# Patient Record
Sex: FEMALE | Race: WHITE | NOT HISPANIC OR LATINO | URBAN - METROPOLITAN AREA
[De-identification: names, ages, dates, MRNs, and addresses within clinical notes are randomized per-mention and may not be internally consistent; named-entity substitution may affect disease eponyms.]

---

## 2019-01-06 ENCOUNTER — OFFICE VISIT - RIVER FALLS (OUTPATIENT)
Dept: FAMILY MEDICINE | Facility: CLINIC | Age: 64
End: 2019-01-06

## 2019-01-06 LAB
ALBUMIN UR-MCNC: NEGATIVE G/DL
BILIRUB UR QL STRIP: NEGATIVE
GLUCOSE UR STRIP-MCNC: NEGATIVE MG/DL
HGB UR QL STRIP: ABNORMAL
KETONES UR STRIP-MCNC: NEGATIVE MG/DL
LEUKOCYTE ESTERASE UR QL STRIP: ABNORMAL
NITRATE UR QL: NEGATIVE
PH UR STRIP: 7 [PH] (ref 5–8)
SP GR UR STRIP: 1.01 (ref 1–1.03)

## 2019-01-06 ASSESSMENT — MIFFLIN-ST. JEOR: SCORE: 1168.45

## 2019-01-08 LAB — BACTERIA SPEC CULT: NORMAL

## 2019-01-10 ENCOUNTER — COMMUNICATION - RIVER FALLS (OUTPATIENT)
Dept: FAMILY MEDICINE | Facility: CLINIC | Age: 64
End: 2019-01-10

## 2019-01-10 ENCOUNTER — OFFICE VISIT - RIVER FALLS (OUTPATIENT)
Dept: FAMILY MEDICINE | Facility: CLINIC | Age: 64
End: 2019-01-10

## 2019-01-10 LAB
ALBUMIN UR-MCNC: NEGATIVE G/DL
BILIRUB UR QL STRIP: NEGATIVE
GLUCOSE UR STRIP-MCNC: NEGATIVE MG/DL
HGB UR QL STRIP: NEGATIVE
KETONES UR STRIP-MCNC: NEGATIVE MG/DL
LEUKOCYTE ESTERASE UR QL STRIP: ABNORMAL
NITRATE UR QL: NEGATIVE
PH UR STRIP: 5.5 [PH] (ref 5–8)
SP GR UR STRIP: 1.01 (ref 1–1.03)

## 2019-01-10 ASSESSMENT — MIFFLIN-ST. JEOR: SCORE: 1168.45

## 2019-01-12 LAB — BACTERIA SPEC CULT: NORMAL

## 2019-01-24 ENCOUNTER — OFFICE VISIT - RIVER FALLS (OUTPATIENT)
Dept: FAMILY MEDICINE | Facility: CLINIC | Age: 64
End: 2019-01-24

## 2019-01-24 ASSESSMENT — MIFFLIN-ST. JEOR: SCORE: 1170.26

## 2019-01-25 LAB
CHOLEST SERPL-MCNC: 218 MG/DL
CHOLEST/HDLC SERPL: 2.8 {RATIO}
HBA1C MFR BLD: 5.4 %
HDLC SERPL-MCNC: 78 MG/DL
LDLC SERPL CALC-MCNC: 123 MG/DL
NONHDLC SERPL-MCNC: 140 MG/DL
TRIGL SERPL-MCNC: 76 MG/DL

## 2019-12-23 ENCOUNTER — OFFICE VISIT - RIVER FALLS (OUTPATIENT)
Dept: FAMILY MEDICINE | Facility: CLINIC | Age: 64
End: 2019-12-23

## 2019-12-23 ASSESSMENT — MIFFLIN-ST. JEOR: SCORE: 1193.85

## 2020-06-01 ENCOUNTER — OFFICE VISIT - RIVER FALLS (OUTPATIENT)
Dept: FAMILY MEDICINE | Facility: CLINIC | Age: 65
End: 2020-06-01

## 2020-06-23 ENCOUNTER — COMMUNICATION - RIVER FALLS (OUTPATIENT)
Dept: FAMILY MEDICINE | Facility: CLINIC | Age: 65
End: 2020-06-23

## 2022-02-12 VITALS
HEART RATE: 69 BPM | HEIGHT: 64 IN | TEMPERATURE: 98.2 F | OXYGEN SATURATION: 98 % | WEIGHT: 147 LBS | DIASTOLIC BLOOD PRESSURE: 82 MMHG | SYSTOLIC BLOOD PRESSURE: 144 MMHG | BODY MASS INDEX: 25.1 KG/M2

## 2022-02-12 VITALS
OXYGEN SATURATION: 98 % | HEIGHT: 64 IN | TEMPERATURE: 97.8 F | HEIGHT: 64 IN | HEART RATE: 70 BPM | DIASTOLIC BLOOD PRESSURE: 78 MMHG | SYSTOLIC BLOOD PRESSURE: 142 MMHG | SYSTOLIC BLOOD PRESSURE: 134 MMHG | BODY MASS INDEX: 24.21 KG/M2 | WEIGHT: 141.8 LBS | HEART RATE: 64 BPM | WEIGHT: 141.4 LBS | HEART RATE: 56 BPM | BODY MASS INDEX: 24.14 KG/M2 | DIASTOLIC BLOOD PRESSURE: 70 MMHG | BODY MASS INDEX: 24.14 KG/M2 | WEIGHT: 141.4 LBS | SYSTOLIC BLOOD PRESSURE: 110 MMHG | TEMPERATURE: 97.8 F | DIASTOLIC BLOOD PRESSURE: 98 MMHG | HEIGHT: 64 IN | TEMPERATURE: 97.8 F

## 2022-02-12 VITALS — HEIGHT: 64 IN

## 2022-02-15 NOTE — PROGRESS NOTES
Patient:   ALFRED PERSAUD            MRN: [id number]            FIN: [id number]               Age:   63 years     Sex:  Female     :  1955   Associated Diagnoses:   Well adult   Author:   Dary Cuba      Chief Complaint   2019 4:57 PM CST    Pt here for annual Px, also c/o sore throat that started today.        Well Adult History   Well Adult History             The patient presents for well adult exam, Meredith is here for an annual exam, has been many years since any preventative care.  She had pelvic last visit and pain/pressure resolved with treatment for UTI. Has had hysterectomy and only one ovary remains, no paps needed    , quit smoking 3 years ago when mom dx with small cell lung cancer. 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    has never had colon cancer screening, will not do colonoscopy, agreeable to cologuard.  No mammo for at least 5 years , will set up screen  No hx DEXA and likely early menopause (never had symtpoms), will set up if covered by insurance    Agreeable to tdap, not flu shot.  The general health status is good.  The patient's diet is described as balanced.  Exercise: occasional.  Associated symptoms consist of none.  Medical encounters:.        Review of Systems   Constitutional:  Negative except as documented in history of present illness.    Eye:  Negative except as documented in history of present illness.    Respiratory:  Negative except as documented in history of present illness.    Cardiovascular:  Negative except as documented in history of present illness.    Breast:  Negative.    Gastrointestinal:  Negative except as documented in history of present illness.    Genitourinary:  Negative except as documented in history of present illness.    Gynecologic:  Negative except as documented in history of present illness.    Hematology/Lymphatics:  Negative except as documented in history of  present illness.    Endocrine:  Negative except as documented in history of present illness.    Immunologic:  Negative except as documented in history of present illness.    Musculoskeletal:  Negative except as documented in history of present illness.    Integumentary:  Negative except as documented in history of present illness.    Neurologic:  Negative except as documented in history of present illness.    Psychiatric:  Negative except as documented in history of present illness.              Health Status   Allergies:    Allergic Reactions (Selected)  Severity Not Documented  Augmentin (Vomiting..... and diarrhea)  Ibuprofen (Abdominal pain)   Medications:  (Selected)      Problem list:    All Problems  Gastric Ulcer / ICD-9-.90 / Confirmed  PUD (Peptic Ulcer Disease) / ICD-9-.90 / Confirmed  Resolved: *Hospitalized@[facility] - Influenza A      Histories   Past Medical History:    Active  Gastric Ulcer (531.90)  PUD (Peptic Ulcer Disease) (533.90)  Resolved  *Hospitalized@[facility] - Influenza A: Onset on 2016 at 60 years.  Resolved on 2016 at 60 years.   Family History:    Grandmother (M)  Breast cancer     Procedure history:    LAVH - Laparoscopy assisted vaginal hysterectomy (SNOMED CT 9248849863) on 2005 at 50 Years.  Hysteroscopy (SNOMED CT 713224440) on 2003 at 48 Years.  Dilation and curettage (SNOMED CT 24666304) on 2003 at 48 Years.  Dilation and curettage (SNOMED CT 15071864) on 2002 at 46 Years.  Hysteroscopy (SNOMED CT 874396463) on 2002 at 46 Years.  TL - Tubal ligation (SNOMED CT 901357859) in  at 34 Years.  Childbirth (SNOMED CT 8745888429).  Comments:  2010 11:14 AM Ana Jarrell   3, Para 3   Social History:        Alcohol Assessment: Current            Current      Tobacco Assessment: Current            Current, Cigarettes, 3 per day.      Substance Abuse Assessment: Denies Substance Abuse      Employment and Education Assessment             Employed, Work/School description: .      Exercise and Physical Activity Assessment: Occasional exercise            Exercise type: Walking.      Other Assessment            Marital status      Physical Examination   Vital Signs   1/24/2019 4:57 PM CST Temperature Tympanic 97.8 DegF  LOW    Peripheral Pulse Rate 56 bpm  LOW    Pulse Site Radial artery    HR Method Manual    Systolic Blood Pressure 142 mmHg  HI    Diastolic Blood Pressure 98 mmHg  HI    Mean Arterial Pressure 113 mmHg    BP Site Right arm    BP Method Manual      Measurements from flowsheet : Measurements   1/24/2019 4:57 PM CST Height Measured - Standard 63.5 in    Weight Measured - Standard 141.8 lb    BSA 1.7 m2    Body Mass Index 24.72 kg/m2      General:  Alert and oriented, No acute distress, vital signs stable, as noted above.    Eye:  Pupils are equal, round and reactive to light, Extraocular movements are intact, Normal conjunctiva.    HENT:  Normocephalic, Tympanic membranes are clear, Normal hearing, Oral mucosa is moist, No pharyngeal erythema.    Neck:  Supple, Non-tender, No lymphadenopathy, No thyromegaly.    Respiratory:  Lungs are clear to auscultation, Respirations are non-labored, Breath sounds are equal, Symmetrical chest wall expansion.    Cardiovascular:  Normal rate, Regular rhythm, No murmur, No edema.    Gastrointestinal:  Soft, Non-tender, Non-distended, No organomegaly.    Musculoskeletal:  Normal range of motion, Normal strength, No deformity, Normal gait.    Integumentary:  Warm, Dry, Pink, Intact, No rash.    Neurologic:  Alert, Oriented, Normal sensory, Normal motor function, Cranial Nerves II-XII are grossly intact.    Psychiatric:  Cooperative, Appropriate mood & affect, Normal judgment, PHQ 9/CAGE questionaire reviewed and discussed with patient,  see score.       Impression and Plan   Diagnosis     Well adult (HYC66-GI Z00.00).     Patient Instructions:       Counseled: Patient, Regarding  diagnosis, Regarding medications, Verbalized understanding, counseled on health benefits of healthy weight, regular exercise, healthy diet, set up mmamo, DEXA, consume adequate calcium and vit D supplement, set up cologuard.    Orders     labs today.

## 2022-02-15 NOTE — LETTER
(Inserted Image. Unable to display)   January 25, 2019      CYNDEE PERSAUD      [address] [place], [place] [id number]        Dear CYNDEE,    Thank you for selecting [facility] for your healthcare needs.  Below you will find the results of the recent tests done at our clinic.     Diabetes screen is negative and your cholesterol numbers are good, Cyndee. Cholesterol and other lipid levels help determine your risk for developing heart and blood vessel disease.  A normal adult Total Cholesterol should be under 200, the LDL (bad cholesterol) should be under 130 and is most influenced by dietary intake of cholesterol.   The HDL (good cholesterol) should be over 45 can be influenced by exercise.  The triglyceride level is another type of fat and should be under 150.  Try to avoid saturated fats and transfats in your diet.      So nice to see you!        Result Name Current Result Reference Range   Hgb A1c  5.4 1/24/2019  - <5.7   Cholesterol (mg/dL) ((H)) 218 1/24/2019  - <200   Non-HDL Cholesterol ((H)) 140 1/24/2019  - <130   HDL (mg/dL)  78 1/24/2019 >50 -    Cholesterol/HDL Ratio  2.8 1/24/2019  - <5.0   LDL ((H)) 123 1/24/2019    Triglyceride (mg/dL)  76 1/24/2019  - <150       Please contact me or my assistant at [phone] if you have any questions or concerns.     Sincerely,        NERISSA Bray-NP  Family Nurse Practitioner      What do your labs mean?  Below is a glossary of commonly ordered labs:  LDL   Bad Cholesterol   HDL   Good Cholesterol  AST/ALT   Liver Function   Cr/Creatinine   Kidney Function  Microalbumin   Kidney Function  BUN   Kidney Function  PSA   Prostate    TSH   Thyroid Hormone  HgbA1c   Diabetes Test   Hgb (Hemoglobin)   Red Blood Cells  WBC   White Blood Cell Count

## 2022-02-15 NOTE — NURSING NOTE
CAGE Assessment Entered On:  1/25/2019 10:43 AM CST    Performed On:  1/24/2019 10:42 AM CST by Genie Dalal               Assessment   Have you ever felt you should cut down on your drinking :   No   Have people annoyed you by criticizing your drinking :   No   Have you ever felt bad or guilty about your drinking :   No   Have you ever taken a drink first thing in the morning to steady your nerves or get rid of a hangover (Eye-opener) :   No   CAGE Score :   0    Genie Dalal - 1/25/2019 10:42 AM CST

## 2022-02-15 NOTE — NURSING NOTE
Comprehensive Intake Entered On:  12/23/2019 3:55 PM CST    Performed On:  12/23/2019 3:50 PM CST by Uyen Ennis               Summary   Chief Complaint :   Pt c/o cough with chest pain and SOB.   Menstrual Status :   Menarcheal   Weight Measured :   147 lb(Converted to: 147 lb 0 oz, 66.68 kg)    Height Measured :   63.5 in(Converted to: 5 ft 3 in, 161.29 cm)    Body Mass Index :   25.63 kg/m2 (HI)    Body Surface Area :   1.73 m2   Systolic Blood Pressure :   144 mmHg (HI)    Diastolic Blood Pressure :   82 mmHg (HI)    Mean Arterial Pressure :   103 mmHg   Peripheral Pulse Rate :   69 bpm   Temperature Tympanic :   98.2 DegF(Converted to: 36.8 DegC)    Oxygen Saturation :   98 %   Uyen Ennis - 12/23/2019 3:50 PM CST   Health Status   Allergies Verified? :   Yes   Medication History Verified? :   Yes   Medical History Verified? :   Yes   Pre-Visit Planning Status :   Completed   Tobacco Use? :   Never smoker   Uyen Ennis - 12/23/2019 3:50 PM CST   Meds / Allergies   (As Of: 12/23/2019 3:55:27 PM CST)   Allergies (Active)   Augmentin  Estimated Onset Date:   Unspecified ; Reactions:   Vomiting....., Diarrhea ; Created By:   Lupe Street CMA; Reaction Status:   Active ; Category:   Drug ; Substance:   Augmentin ; Type:   Allergy ; Updated By:   Lupe Street CMA; Reviewed Date:   12/23/2019 3:53 PM CST      ibuprofen  Estimated Onset Date:   Unspecified ; Reactions:   Abdominal Pain ; Created By:   Ana Barton; Reaction Status:   Active ; Category:   Drug ; Substance:   ibuprofen ; Type:   Allergy ; Updated By:   Ana Braton; Reviewed Date:   12/23/2019 3:53 PM CST        Medication List   (As Of: 12/23/2019 3:55:27 PM CST)   No Known Home Medications     Uyen Ennis - 12/23/2019 3:53:27 PM

## 2022-02-15 NOTE — NURSING NOTE
"Comprehensive Intake Entered On:  1/6/2019 8:56 AM CST    Performed On:  1/6/2019 8:52 AM CST by Laney Zepeda CMA               Summary   Chief Complaint :   lower abdominal pain/pressure, constantly feels like she needs to urinate, some discomfort and burning with urination, urine looks \"strange\" x4 days, no blood seen in urine   Menstrual Status :   Menarcheal   Weight Measured :   141.4 lb(Converted to: 141 lb 6 oz, 64.14 kg)    Height Measured :   63.5 in(Converted to: 5 ft 3 in, 161.29 cm)    Body Mass Index :   24.65 kg/m2   Body Surface Area :   1.69 m2   Systolic Blood Pressure :   134 mmHg (HI)    Diastolic Blood Pressure :   78 mmHg   Mean Arterial Pressure :   97 mmHg   Peripheral Pulse Rate :   70 bpm   BP Site :   Right arm   BP Method :   Manual   Temperature Tympanic :   97.8 DegF(Converted to: 36.6 DegC)  (LOW)    Oxygen Saturation :   98 %   Laney Zepeda CMA - 1/6/2019 8:52 AM CST   Health Status   Allergies Verified? :   Yes   Medication History Verified? :   Yes   Medical History Verified? :   No   Pre-Visit Planning Status :   N/A   Tobacco Use? :   Former smoker   Laney Zepeda CMA - 1/6/2019 8:52 AM CST   Meds / Allergies   (As Of: 1/6/2019 8:56:53 AM CST)   Allergies (Active)   Augmentin  Estimated Onset Date:   Unspecified ; Reactions:   Vomiting....., Diarrhea ; Created By:   Lupe Street CMA; Reaction Status:   Active ; Category:   Drug ; Substance:   Augmentin ; Type:   Allergy ; Updated By:   Lupe Street CMA; Reviewed Date:   4/12/2016 10:21 AM CDT      ibuprofen  Estimated Onset Date:   Unspecified ; Reactions:   Abdominal Pain ; Created By:   Ana Barton; Reaction Status:   Active ; Category:   Drug ; Substance:   ibuprofen ; Type:   Allergy ; Updated By:   Ana Barton; Reviewed Date:   4/12/2016 10:21 AM CDT        Medication List   (As Of: 1/6/2019 8:56:53 AM CST)  "

## 2022-02-15 NOTE — NURSING NOTE
Comprehensive Intake Entered On:  1/10/2019 5:25 PM CST    Performed On:  1/10/2019 5:19 PM CST by Cristina Alvarez               Summary   Chief Complaint :   c/o continuing urinary frequency, bladder pressure, and lower back pain. Saw LIZZ on 1/6/18 and was given Bactrim- finished last dose today. Was also informed that urine culture was negative so she is wondering what is causing the sxs.   Menstrual Status :   Menarcheal   Weight Measured :   141.4 lb(Converted to: 141 lb 6 oz, 64.14 kg)    Height Measured :   63.5 in(Converted to: 5 ft 3 in, 161.29 cm)    Body Mass Index :   24.65 kg/m2   Body Surface Area :   1.69 m2   Systolic Blood Pressure :   110 mmHg   Diastolic Blood Pressure :   70 mmHg   Mean Arterial Pressure :   83 mmHg   Peripheral Pulse Rate :   64 bpm   BP Site :   Right arm   Pulse Site :   Radial artery   BP Method :   Manual   HR Method :   Manual   Temperature Tympanic :   97.8 DegF(Converted to: 36.6 DegC)  (LOW)    Cristina Alvarez - 1/10/2019 5:19 PM CST   Health Status   Allergies Verified? :   Yes   Medication History Verified? :   Yes   Medical History Verified? :   Yes   Pre-Visit Planning Status :   Completed   Tobacco Use? :   Never smoker   Cristina Alvarez - 1/10/2019 5:19 PM CST   Consents   Consent for Immunization Exchange :   Consent Granted   Consent for Immunizations to Providers :   Consent Granted   Cristina Alvarez - 1/10/2019 5:19 PM CST   Meds / Allergies   (As Of: 1/10/2019 5:25:06 PM CST)   Allergies (Active)   Augmentin  Estimated Onset Date:   Unspecified ; Reactions:   Vomiting....., Diarrhea ; Created By:   Lupe Street CMA; Reaction Status:   Active ; Category:   Drug ; Substance:   Augmentin ; Type:   Allergy ; Updated By:   Lupe Street CMA; Reviewed Date:   1/10/2019 5:24 PM CST      ibuprofen  Estimated Onset Date:   Unspecified ; Reactions:   Abdominal Pain ; Created By:   Ana Barton; Reaction Status:   Active ; Category:   Drug ; Substance:    ibuprofen ; Type:   Allergy ; Updated By:   Ana Barton; Reviewed Date:   1/10/2019 5:24 PM CST        Medication List   (As Of: 1/10/2019 5:25:06 PM CST)   Prescription/Discharge Order    sulfamethoxazole-trimethoprim  :   sulfamethoxazole-trimethoprim ; Status:   Processing ; Ordered As Mnemonic:   Bactrim  mg-160 mg oral tablet ; Ordering Provider:   Linette Rose PA-C; Action Display:   Complete ; Catalog Code:   sulfamethoxazole-trimethoprim ; Order Dt/Tm:   1/10/2019 5:24:28 PM

## 2022-02-15 NOTE — PROGRESS NOTES
Chief Complaint    Pt c/o cough with chest pain and SOB.  History of Present Illness      Chief complaint as above reviewed and confirmed with patient.  Pt presents to the clinic with concerns re: persistent uri, cough and conestion.  Sx preset for the last 3 weeks.  productive cough, blood tinged at times.  No fevers, no wheezing. Some wheezing especially at night.    No V/D. Some facial pressure and HA.  Review of Systems      Review of systems is negative with the exception of those noted in HPI          Physical Exam   Vitals & Measurements    T: 98.2   F (Tympanic)  HR: 69(Peripheral)  BP: 144/82  SpO2: 98%     HT: 63.5 in  WT: 147 lb  BMI: 25.63           Vitals as above per nursing documentation           Constitutional : nad appears well          Ears: ears patent B, TMS intact, noninjected           Nose: nasal mucosa is ededmatous. clear discharge           Throat: pharynx is nonerythematous, no tonsillar hypertrophy, no exudate           Neck: neck supple, no adenopathy, no thyromegaly, no rigidity           Lungs: lungs CTA', no Wheezes, rhonchi or rales           Heart: heart RRR, nl S1, S2 no murmur           skin:  No rashes              Assessment/Plan       1. Bronchitis (J40)         given duraiton of sx will cover with doxycycline and albuterol for wheezing.  Pt instructed to return to clinic for persistent or worsening symptoms.                  Orders:         albuterol, 2 puff(s), Inhale, q4 hrs, # 17 gm, 2 Refill(s), Type: Maintenance, Pharmacy: [facility], 2 puff(s) Inhale q4 hrs, (Ordered)         doxycycline, = 1 cap(s) ( 100 mg ), Oral, bid, x 10 day(s), # 20 cap(s), 0 Refill(s), Type: Acute, Pharmacy: [facility], 1 cap(s) Oral bid,x10 day(s), (Ordered)  Patient Information     Name:ALFRED PERSAUD      Address:      [address], [place] [id number]     Sex:Female     YOB: 1955     [phone]      Emergency Contact:BRUGGEMAN, DUANE W     MRN:[id number]     FIN:[id number]     Location:[facility]     Date of Service:2019      Primary Care Physician:       Zeb Stevenson MD, [phone]      Attending Physician:       Linette Rose PA-C, [phone]  Problem List/Past Medical History    Ongoing     Gastric ulcer     PUD (peptic ulcer disease)    Historical     *Hospitalized@[facility] - Influenza A  Procedure/Surgical History     LAVH - Laparoscopy assisted vaginal hysterectomy (2005)           Dilation and curettage (2003)           Hysteroscopy (2003)           Dilation and curettage (2002)           Hysteroscopy (2002)           TL - Tubal ligation ()           Childbirth            Comments:  3, Para 3.  Medications    albuterol 90 mcg/inh inhalation aerosol, 2 puff(s), Inhale, q4 hrs, 2 refills    doxycycline monohydrate 100 mg oral capsule, 100 mg= 1 cap(s), Oral, bid  Allergies    Augmentin (Vomiting....., Diarrhea)    ibuprofen (Abdominal Pain)  Social History    Smoking Status - 2019     Never smoker     Alcohol      Current, 2019     Employment/School      Employed, Work/School description: ., 2010     Exercise      Exercise type: Walking., 2010     Other      Marital status, 2010     Tobacco      Former smoker, quit more than 30 days ago, Cigarettes, 2019  Family History    Breast cancer: Grandmother (M).    CA - Lung cancer: Mother (Dx at 83).    Hypertension: Mother.    Myocardial infarction: Father (Dx at 80).  Immunizations      Vaccine Date Status          tetanus/diphth/pertuss (Tdap) adult/adol 2019 Given          Td 2005 Recorded

## 2022-02-15 NOTE — PROGRESS NOTES
"   Patient:   ALFRED PERSAUD            MRN: [id number]            FIN: [id number]               Age:   65 years     Sex:  Female     :  1955   Associated Diagnoses:   Tick bite   Author:   Juan Ramon Shine PA-C      Visit Information   Visit type:  Video Visit via Cloud[employer].    Participants in room during visit:  _1   Location of patient:  _home  Location of provider:  _  Home office  Video Start Time:  _0836  Video End Time:   _08    Today's visit was conducted via video conference due to the COVID-19 pandemic.  The patient's consent to proceed with a video visit has been obtained and documented.      Chief Complaint   2020 8:28 AM CDT     Verbal consent given for a video visit.  Pt had an embedded tick bite on lower left leg 2 weeks ago.  Pt states bite area has been \"red\" the x 5 days.        History of Present Illness   Patient is a _65 year old female_ who is being evaluated via a billable video visit.    2 week hx of tick bite on left lower leg  now 5 day hx of red spot and inflammation in the area  has 3 cm area of redness and swelling  in the area as viewed by video  no fevers or myalgia         Review of Systems   Constitutional:  Negative.    Respiratory:  Negative.    Musculoskeletal:  Negative.    Integumentary:  Skin lesion.       Health Status   Allergies:    Allergic Reactions (Selected)  Severity Not Documented  Augmentin (Vomiting..... and diarrhea)  Ibuprofen (Abdominal pain)   Medications:  (Selected)      Problem list:    All Problems  Gastric ulcer / SNOMED CT 6591834787 / Confirmed  PUD (peptic ulcer disease) / SNOMED CT 48548753 / Confirmed      Histories   Past Medical History:    Active  Gastric ulcer (0169220474)  PUD (peptic ulcer disease) (69501074)  Resolved  *Hospitalized@[facility] - Influenza A: Onset on 2016 at 60 years.  Resolved on 2016 at 60 years.   Family History:    CA - Lung cancer  Mother: onset at 83 .  Breast cancer  Grandmother " (M)  Hypertension  Mother  Myocardial infarction  Father: onset at 80 .     Procedure history:    LAVH - Laparoscopy assisted vaginal hysterectomy (1648380414) on 2005 at 50 Years.  Hysteroscopy (787872177) on 2003 at 48 Years.  Dilation and curettage (25762857) on 2003 at 48 Years.  Dilation and curettage (50406192) on 2002 at 46 Years.  Hysteroscopy (759239206) on 2002 at 46 Years.  TL - Tubal ligation (232056923) in  at 34 Years.  Childbirth (0124908124).  Comments:  2010 11:14 AM [facility] - Mick Ana   3, Para 3   Social History:        Alcohol Assessment            Current      Tobacco Assessment            Former smoker, quit more than 30 days ago, Cigarettes      Employment and Education Assessment            Employed, Work/School description: .      Exercise and Physical Activity Assessment            Exercise type: Walking.      Other Assessment            Marital status        Physical Examination   Measurements from flowsheet : Measurements   2020 8:28 AM CDT     Height Measured - Standard                63.5 in     General:  No acute distress.    Respiratory:  Respirations are non-labored.    Integumentary:  per video assessment there is a 3-4 cm area of redness and inflammation of left lower leg, no drainage.    Psychiatric:  Cooperative, Appropriate mood & affect, Normal judgment.       Impression and Plan   Diagnosis     Tick bite (NNQ45-CE W57.XXXA).     Summary:  will treat with topical neosporin and oral doxycycline for 2 weeks, follow up if not improving.    Orders     Orders   Pharmacy:  doxycycline hyclate 100 mg oral tablet (Prescribe): = 1 tab(s) ( 100 mg ), PO, bid, x 14 day(s), Instructions: may substitute for cheapest form of doxycycline  may take with food to minimize abdominal discomfort, # 28 tab(s), 0 Refill(s), Type: Maintenance, Pharmacy: [facility] [place], 1 tab(s) Oral bid,x14 day(s),Instr:may  substitute for cheapest form of doxycycline; may take with food to minimize abdominal discomfort, 63.5, in, 6/1/2020 8:28 AM CDT, Height Measured, 147, lb, 12/23/2019 3:50 PM CST, Weight Measured.     Orders   Charges (Evaluation and Management):  [id number] office outpatient visit 15 minutes (Charge) (Order): Quantity: 1, Tick bite.        Review / Management   Differential diagnosis:  Lyme disease vs cellulitis.

## 2022-02-15 NOTE — LETTER
(Inserted Image. Unable to display)   January 14, 2019      ALFRED PERSAUD      [address] [place], [place] [id number]        Dear ALFRED,    Thank you for selecting [facility] for your healthcare needs.  Below you will find the results of the recent tests done at our clinic.     The urine culture is again negative for bacteriaKimber.  See me as planned in about 2 weeks for an annual exam and if you are still having symptoms then, we will set you up with a pelvic ultrasound.  So good to see you!      Result Name Current Result Previous Result   Culture Urine  See comment 1/10/2019   1/6/2019       Please contact me or my assistant at [phone] if you have any questions or concerns.     Sincerely,        NERISSA Bray-NP  Family Nurse Practitioner      What do your labs mean?  Below is a glossary of commonly ordered labs:  LDL   Bad Cholesterol   HDL   Good Cholesterol  AST/ALT   Liver Function   Cr/Creatinine   Kidney Function  Microalbumin   Kidney Function  BUN   Kidney Function  PSA   Prostate    TSH   Thyroid Hormone  HgbA1c   Diabetes Test   Hgb (Hemoglobin)   Red Blood Cells  WBC   White Blood Cell Count

## 2022-02-15 NOTE — PROGRESS NOTES
"Chief Complaint    lower abdominal pain/pressure, constantly feels like she needs to urinate, some discomfort and burning with urination, urine looks \"strange\" x4 days, no blood seen in urine  History of Present Illness      Chief complaint as above reviewed and confirmed with patient.  Pt presents to the clinic with concerns re: dysuria, frequency, urgency. no hematuria. no nausea, vomiting, fevers chills or abdominal pain/back pain.  Last UTI years ago. no vaginal sx.  Review of Systems      Review of systems is negative with the exception of those noted in HPI          Physical Exam   Vitals & Measurements    T: 97.8   F (Tympanic)  HR: 70(Peripheral)  BP: 134/78  SpO2: 98%     HT: 63.5 in  WT: 141.4 lb  BMI: 24.65       Pt is in no acute distress.  Appears well.      MM moist, skin turger good.      Lungs CTA      Heart RRR      Abdomen: BS active, soft, non-distended. non tender to light or deep palpation.                           no rebound or peritoneal signs.  no CVAT         Assessment/Plan       1. Acute UTI (N39.0)         bactrim as ordered. PI given and disucssed.  Pt instructed to return to clinic for persistent or worsening symptoms.                           Dysuria (R30.0)         Ordered:          POC URINALYSIS, UA* ([facility]), Specimen Type: Urine, Collection Date: 01/06/19 8:42:00 CST                Orders:         sulfamethoxazole-trimethoprim, 1 tab(s), PO, BID, # 10 tab(s), 0 Refill(s), Type: Maintenance, Pharmacy: [facility] [id number], 1 tab(s) Oral bid,x5 day(s), (Ordered)  Patient Information     Name:ALFRED PERSAUD      Address:      [address] [place] [id number]     Sex:Female     YOB: 1955     [phone]     Emergency Contact:BRUGGEMAN, DUANE W     MRN:[id number]     FIN:[id number]     Location:[facility]     Date of Service:01/06/2019      Primary Care Physician:       Zeb Stevenson MD, [phone]      Attending " Physician:       Rose MCCULLOUGH, Linette GRIGGS, [phone]  Problem List/Past Medical History    Ongoing     Gastric Ulcer     PUD (Peptic Ulcer Disease)    Historical     *Hospitalized@[facility] - Influenza A  Procedure/Surgical History     LAVH - Laparoscopy assisted vaginal hysterectomy (2005)           Dilation and curettage (2003)           Hysteroscopy (2003)           Dilation and curettage (2002)           Hysteroscopy (2002)           TL - Tubal ligation ()           Childbirth            Comments:  3, Para 3.  Medications     Bactrim  mg-160 mg oral tablet: 1 tab(s), PO, BID, for 5 day(s), 10 tab(s), 0 Refill(s).          Allergies    Augmentin (Diarrhea, Vomiting.....)    ibuprofen (Abdominal Pain)  Social History    Smoking Status - 2019     Former smoker     Alcohol - Current, 2010      Current, 2010     Employment and Education      Employed, Work/School description: ., 2010     Exercise and Physical Activity - Occasional exercise, 2010      Exercise type: Walking., 2010     Other      Marital status, 2010     Substance Abuse - Denies Substance Abuse, 2010     Tobacco - Current, 2010      Current, Cigarettes, 3 per day., 2010  Family History    Breast cancer: Grandmother (M).  Immunizations      Vaccine Date Status      Td 2005 Recorded  Lab Results       Lab Results (Last 4 results within 90 days)        UA pH: 7 [5  - 8] (19 08:46:00)       UA Specific Gravity: 1.01 [1.001  - 1.035] (19 08:46:00)       UA Glucose: NEGATIVE [NEGATIVE  - NEGATIVE] (19 08:46:00)       UA Bilirubin: NEGATIVE [NEGATIVE  - NEGATIVE] (19 08:46:00)       UA Ketones: NEGATIVE [NEGATIVE  - NEGATIVE] (19 08:46:00)       Urine Occult Blood: 1+ Abnormal [NEGATIVE  - NEGATIVE] (19 08:46:00)       UA Protein: NEGATIVE [NEGATIVE  - NEGATIVE] (19 08:46:00)       UA  Nitrite: NEGATIVE [NEGATIVE  - NEGATIVE] (01/06/19 08:46:00)       UA Leukocyte Esterase: 3+ Abnormal [NEGATIVE  - NEGATIVE] (01/06/19 08:46:00)       UA WBC Clumps: Present [None  - Few] (01/06/19 08:55:00)       UA Epithelial Cells: Few [None  - Few] (01/06/19 08:55:00)       UA WBC: >100 [None  - 5] (01/06/19 08:55:00)       UA RBC: 3-5 [None  - 2] (01/06/19 08:55:00)       UA Bacteria: Few [None  - Few] (01/06/19 08:55:00)

## 2022-02-15 NOTE — TELEPHONE ENCOUNTER
---------------------  From: Silvia Lobato LPN   To: [employer] Message Pool ([id number]);     Sent: 2/4/2019 2:42:22 PM CST  Subject: cologard      PCP:   Ganga BLANDON  calling for NCB    Time of Call:  _ 1421       Person Calling:  _ pt  Phone number:  _ [phone]    Note:   _ pt LM stating that her insurance will cover the Cologuard test5:53pm Called and spoke with patient. Asked if she had signed the Cologuard form while in clinic but she can't remember. I said I would check into it, and if needed, she may need to stop in and sign if not already done.---------------------  From: Laney Zepeda LPN ([employer] Message Pool ([address]))   To: Cristina Alvarez;     Sent: 2/4/2019 6:34:06 PM CST  Subject: FW: cologardNOtified pt we already filled out ppw when she was here and will fax today.

## 2022-02-15 NOTE — TELEPHONE ENCOUNTER
---------------------  From: Angela Arellano RN (Phone Messages Pool ([address]))   To: [facility] Message [employer] ([address] [place] [address]);     Sent: 6/23/2020 9:20:10 AM CDT  Subject: Phone Message     Phone Message    PCP:   JAMIA saw DW      Time of Call:  0908       Person Calling:  Pt  Phone number:  [phone]    Returned call at: _    Note:   Pt called following up on tick bite and Doxycycline. She states she took her last pill on 6-14-20. On 6-16-20 she noticed stiff neck and body aches. She is wondering if she needs another round of Doxy or if this is to be expected.   Should pt have follow up visit? Please advise.     Last office visit and reason:  6-1-20 video visit, tick bite w/DWG---------------------  From: Nicko Castro CMA ([facility] Message Pool ([address]))   To: Juan Ramon Shine PA-C;     Sent: 6/23/2020 9:28:06 AM CDT  Subject: FW: Phone Message---------------------  From: Juan Ramon Shine PA-C   To: [employer] ([address]);     Sent: 6/23/2020 10:19:30 AM CDT  Subject: RE: Phone Message     I called patient, she is still having some neck pain and body aches intermittently.  Will repeat 2 weeks of doxycycline (Rx sent in)  also advised to use NSAIDs and ice/heat.  Follow up if sxs continuenoted.  Nicko Castro CMA

## 2022-02-15 NOTE — NURSING NOTE
Depression Screening Entered On:  1/25/2019 10:43 AM CST    Performed On:  1/24/2019 10:42 AM CST by Genie Dalal               Depression Screening   Feeling Down, Depressed, Hopeless :   Not at all   Little Interest - Pleasure in Activities :   Not at all   Initial Depression Screen Score :   0    Trouble Falling or Staying Asleep :   Not at all   Feeling Tired or Little Energy :   Not at all   Poor Appetite or Overeating :   Not at all   Feeling Bad About Yourself :   Not at all   Trouble Concentrating :   Not at all   Moving or Speaking Slowly :   Not at all   Thoughts Better Off Dead or Hurting Self :   Not at all   Detailed Depression Screen Score :   0    Total Depression Screen Score :   0    KIM Difficulty with Work, Home, Others :   Not difficult at all   Genie Dalal - 1/25/2019 10:42 AM CST

## 2022-02-15 NOTE — NURSING NOTE
"Comprehensive Intake Entered On:  6/1/2020 8:30 AM CDT    Performed On:  6/1/2020 8:28 AM CDT by Nicko Castro CMA               Summary   Chief Complaint :   Verbal consent given for a video visit.  Pt had an embedded tick bite on lower left leg 2 weeks ago.  Pt states bite area has been \"red\" the x 5 days.   Menstrual Status :   Menarcheal   Height Measured :   63.5 in(Converted to: 5 ft 3 in, 161.29 cm)    Nicko Castro CMA - 6/1/2020 8:28 AM CDT   Health Status   Allergies Verified? :   Yes   Medication History Verified? :   Yes   Medical History Verified? :   Yes   Pre-Visit Planning Status :   Not completed   Tobacco Use? :   Former smoker   Nicko Castro CMA - 6/1/2020 8:28 AM CDT   Meds / Allergies   (As Of: 6/1/2020 8:30:59 AM CDT)   Allergies (Active)   Augmentin  Estimated Onset Date:   Unspecified ; Reactions:   Vomiting....., Diarrhea ; Created By:   Lupe Street CMA; Reaction Status:   Active ; Category:   Drug ; Substance:   Augmentin ; Type:   Allergy ; Updated By:   Lupe Street CMA; Reviewed Date:   6/1/2020 8:29 AM CDT      ibuprofen  Estimated Onset Date:   Unspecified ; Reactions:   Abdominal Pain ; Created By:   Ana Barton; Reaction Status:   Active ; Category:   Drug ; Substance:   ibuprofen ; Type:   Allergy ; Updated By:   Ana Barton; Reviewed Date:   6/1/2020 8:29 AM CDT        Medication List   (As Of: 6/1/2020 8:30:59 AM CDT)   Prescription/Discharge Order    albuterol  :   albuterol ; Status:   Processing ; Ordered As Mnemonic:   albuterol 90 mcg/inh inhalation aerosol ; Ordering Provider:   Linette Rose PA-C Action Display:   Complete ; Catalog Code:   albuterol ; Order Dt/Tm:   6/1/2020 8:29:19 AM CDT            ID Risk Screen   Recent Travel History :   No recent travel   Family Member Travel History :   No recent travel   Other Exposure to Infectious Disease :   Unknown   Nicko Castro CMA - 6/1/2020 8:28 AM CDT   Social History   Social History   (As Of: " 6/1/2020 8:30:59 AM CDT)   Alcohol:        Current   (Last Updated: 1/25/2019 10:42:46 AM CST by Genie Dalal)          Tobacco:        Former smoker, quit more than 30 days ago, Cigarettes   (Last Updated: 1/25/2019 9:55:48 AM CST by Siobhan Schmitz)          Employment/School:        Employed, Work/School description: .   (Last Updated: 12/17/2010 11:15:25 AM CST by Ana Barton)          Exercise:        Exercise type: Walking.   (Last Updated: 12/17/2010 11:15:32 AM CST by Ana Barton)          Other:        Marital status   (Last Updated: 12/17/2010 11:15:44 AM CST by Ana Barton)

## 2022-02-15 NOTE — NURSING NOTE
Comprehensive Intake Entered On:  1/24/2019 5:03 PM CST    Performed On:  1/24/2019 4:57 PM CST by Cristina Alvarez               Summary   Chief Complaint :   Pt here for annual Px, also c/o sore throat that started today.    Menstrual Status :   Menarcheal   Weight Measured :   141.8 lb(Converted to: 141 lb 13 oz, 64.32 kg)    Height Measured :   63.5 in(Converted to: 5 ft 3 in, 161.29 cm)    Body Mass Index :   24.72 kg/m2   Body Surface Area :   1.7 m2   Systolic Blood Pressure :   142 mmHg (HI)    Diastolic Blood Pressure :   98 mmHg (HI)    Mean Arterial Pressure :   113 mmHg   Peripheral Pulse Rate :   56 bpm (LOW)    BP Site :   Right arm   Pulse Site :   Radial artery   BP Method :   Manual   HR Method :   Manual   Temperature Tympanic :   97.8 DegF(Converted to: 36.6 DegC)  (LOW)    Cristina Alvarez - 1/24/2019 4:57 PM CST   Health Status   Allergies Verified? :   Yes   Medication History Verified? :   Yes   Medical History Verified? :   Yes   Pre-Visit Planning Status :   Completed   Tobacco Use? :   Former smoker   Cristina Alvarez - 1/24/2019 4:57 PM CST   Consents   Consent for Immunization Exchange :   Consent Granted   Consent for Immunizations to Providers :   Consent Granted   Cristina Alvarez - 1/24/2019 4:57 PM CST   Meds / Allergies   (As Of: 1/24/2019 5:03:16 PM CST)   Allergies (Active)   Augmentin  Estimated Onset Date:   Unspecified ; Reactions:   Vomiting....., Diarrhea ; Created By:   Lupe Street CMA; Reaction Status:   Active ; Category:   Drug ; Substance:   Augmentin ; Type:   Allergy ; Updated By:   Lupe Street CMA; Reviewed Date:   1/24/2019 5:03 PM CST      ibuprofen  Estimated Onset Date:   Unspecified ; Reactions:   Abdominal Pain ; Created By:   Ana Barton; Reaction Status:   Active ; Category:   Drug ; Substance:   ibuprofen ; Type:   Allergy ; Updated By:   Ana Barton; Reviewed Date:   1/24/2019 5:03 PM CST        Medication List   (As Of: 1/24/2019 5:03:16 PM  CST)   No Known Home Medications     Cristina Alvarez - 1/24/2019 5:03:13 PM

## 2022-02-15 NOTE — PROGRESS NOTES
Patient:   ALFRED PERSAUD            MRN: [id number]            FIN: [id number]               Age:   63 years     Sex:  Female     :  1955   Associated Diagnoses:   Acute cystitis   Author:   Dary Cuba      Chief Complaint   1/10/2019 5:19 PM CST    c/o continuing urinary frequency, bladder pressure, and lower back pain. Saw [facility] on 18 and was given Bactrim- finished last dose today. Was also informed that urine culture was negative so she is wondering what is causing the sxs.        History of Present Illness   Concerning symptoms as listed in Chief Complaint above discussed and confirmed with patient  Has one more tab of bactrim, symptoms of frequency and pain have certainly improved but mostly still feels pressure  She has long hx of back pain but this is lower over the very low back, bilat .  no fever or n/v/d  has had hysterectomy (vaginal) for heavy bleeding many years ago  states she never knew when she went through menopause as she was born with only one ovary and that remains    , no pain or bleeding with IC, some white scant DC, is not itchy  Has been a 'long time' since a pelvic exam , wonders if she needs a pap         Review of Systems   Constitutional:  No fever, No chills.    Gastrointestinal:  No nausea, No vomiting.       Health Status   Allergies:    Allergic Reactions (Selected)  Severity Not Documented  Augmentin (Vomiting..... and diarrhea)  Ibuprofen (Abdominal pain)   Medications:  (Selected)      Problem list:    All Problems  Gastric Ulcer / ICD-9-.90 / Confirmed  PUD (Peptic Ulcer Disease) / ICD-9-.90 / Confirmed  Resolved: *Hospitalized@[facility] - Influenza A      Histories   Past Medical History:    Active  Gastric Ulcer (531.90)  PUD (Peptic Ulcer Disease) (533.90)  Resolved  *Hospitalized@[facility] - Influenza A: Onset on 2016 at 60 years.  Resolved on 2016 at 60 years.   Family History:    Breast cancer  Grandmother (M)     Procedure  history:    LAVH - Laparoscopy assisted vaginal hysterectomy (SNOMED CT 7168774345) on 2005 at 50 Years.  Hysteroscopy (SNOMED CT 111086634) on 2003 at 48 Years.  Dilation and curettage (SNOMED CT 00202731) on 2003 at 48 Years.  Dilation and curettage (SNOMED CT 47124623) on 2002 at 46 Years.  Hysteroscopy (SNOMED CT 994835146) on 2002 at 46 Years.  TL - Tubal ligation (SNOMED CT 164128434) in  at 34 Years.  Childbirth (SNOMED CT 3928970768).  Comments:  2010 11:14 AM CST - Ana Barton   3, Para 3   Social History:        Alcohol Assessment: Current            Current      Tobacco Assessment: Current            Current, Cigarettes, 3 per day.      Substance Abuse Assessment: Denies Substance Abuse      Employment and Education Assessment            Employed, Work/School description: .      Exercise and Physical Activity Assessment: Occasional exercise            Exercise type: Walking.      Other Assessment            Marital status      Physical Examination   Vital Signs   1/10/2019 5:19 PM CST Temperature Tympanic 97.8 DegF  LOW    Peripheral Pulse Rate 64 bpm    Pulse Site Radial artery    HR Method Manual    Systolic Blood Pressure 110 mmHg    Diastolic Blood Pressure 70 mmHg    Mean Arterial Pressure 83 mmHg    BP Site Right arm    BP Method Manual      Measurements from flowsheet : Measurements   1/10/2019 5:19 PM CST Height Measured - Standard 63.5 in    Weight Measured - Standard 141.4 lb    BSA 1.69 m2    Body Mass Index 24.65 kg/m2      General:  Alert and oriented, No acute distress.    Gastrointestinal:  Soft, Non-tender, Non-distended, No organomegaly.    Genitourinary:  No costovertebral angle tenderness, No inguinal tenderness, No urethral discharge, No lesions, pelvic exam reveals no inguinal lymph nodes palpable, no external lesions, no odor just scan white  discharge in the vault. Introitus normal with vault with normal rugae, cervix  absent, no evidence of prolapse. uterus absent.  no adnexal tenderness or masses  .    Integumentary:  Warm, Dry, Pink, No rash.       Review / Management   Results review:  Lab results   1/10/2019 5:55 PM CST UA Epithelial Cells Few    UA WBC 6-10    UA RBC 0-2    UA Bacteria Few   1/10/2019 5:47 PM CST UA pH 5.5    UA Specific Gravity 1.015    UA Glucose NEGATIVE    UA Bilirubin NEGATIVE    UA Ketones NEGATIVE    Urine Occult Blood NEGATIVE    UA Protein NEGATIVE    UA Nitrite NEGATIVE    UA Leukocyte Esterase 1+   .       Impression and Plan   Diagnosis     Acute cystitis ([id number] N30.00).     Plan:  even with neg UC, symptoms seem to have improved with bactrim  she will finish the course and rtc in about 2 weeks for an annual exam with me as she is a bit behind in preventative care. If she is still having any pelvic pressure at that time I told her I would recommend a pelvic US to look at the remaining ovary..    Patient Instructions:       Counseled: Patient, Regarding diagnosis, Regarding treatment, Regarding medications, Verbalized understanding.